# Patient Record
Sex: FEMALE | Race: OTHER | NOT HISPANIC OR LATINO | URBAN - METROPOLITAN AREA
[De-identification: names, ages, dates, MRNs, and addresses within clinical notes are randomized per-mention and may not be internally consistent; named-entity substitution may affect disease eponyms.]

---

## 2021-03-24 ENCOUNTER — EMERGENCY (EMERGENCY)
Facility: HOSPITAL | Age: 26
LOS: 1 days | Discharge: HOME | End: 2021-03-24
Admitting: EMERGENCY MEDICINE
Payer: COMMERCIAL

## 2021-03-24 VITALS
RESPIRATION RATE: 17 BRPM | OXYGEN SATURATION: 99 % | SYSTOLIC BLOOD PRESSURE: 123 MMHG | HEART RATE: 98 BPM | TEMPERATURE: 98 F | DIASTOLIC BLOOD PRESSURE: 81 MMHG

## 2021-03-24 PROCEDURE — 99282 EMERGENCY DEPT VISIT SF MDM: CPT

## 2021-03-24 PROCEDURE — U0005: CPT

## 2021-03-24 PROCEDURE — 99283 EMERGENCY DEPT VISIT LOW MDM: CPT

## 2021-03-24 PROCEDURE — U0003: CPT

## 2021-03-24 NOTE — ED PROVIDER NOTE - NS ED ROS FT
[address] [facility] 
[phone] Patient: Deja Eye MRN: [id number] :2007 You are allergic to the following No active allergies Recent Documentation Height Weight BMI  
  
  
 (!) 1.549 m (85 %, Z= 1.05)* 37.2 kg (39 %, Z= -0.27)* 15.49 kg/m2 (14 %, Z= -1.06)* *Growth percentiles are based on [facility] 2-20 Years data. Emergency Contacts Name Discharge Info Relation Home Work Mobile Non,Gvn About your child's hospitalization Your child was admitted on:  N/A Your child last received care in the:  [address] Your child was discharged on:  2017 Unit phone number:  [phone] Why your child was hospitalized Your child's primary diagnosis was:  Not on File Providers Seen During Your Hospitalizations Provider Role Specialty Primary office phone Dani Black MD Attending Provider Otolaryngology [phone] Your Primary Care Physician (PCP) Primary Care Physician Office Phone Office Fax NONE ** None ** ** None ** Follow-up Information Follow up With Details Comments Contact Info None   None ([phone]) Patient stated that they have no PCP Current Discharge Medication List  
  
Notice You have not been prescribed any medications. Discharge Instructions [facility] Tonsillectomy/Adenoidectomy Post-op Teaching For Ages 9 and Above Following your child's tonsillectomy/adenoidectomy there are several things that often occur. For three to six days after this surgery, your child may seem a little worse each day. This is a common problem for children after this surgery. You will be giving them pain medicine but they just will not feel well and they will not want much to eat or drink. BLEEDING:   
 If your child bleeds after the surgery, it is important that you contact [facility] immediately ([phone]). Fortunately, only a small number of children do this/ If you do not get an immediate response, go to the emergency room. You do not need to do this  If it is just blood-streaked spit. The bleeding typically occurs seven to ten days after surgery. DRINK PLENTY OF FLUIDS: 
Your child may become mildly dehydrated. Your goal is to make sure that this dehydration does not become serious enough that the child needs to have medical attention. Continuously offer your child small frequent sips of beverages. This will not be easy because will not feel like eating or drinking. If you think your child is not getting enough liquid, please call our office. DIET: 
Your child will heal faster with good nutrition. Crunchy foods such as chips, popcorn, nuts, hard candy, etc. should be avoided for two weeks after surgery/ If your child only had an adenoidectomy, there is no diet restriction. FEVER: 
More than likely there will be a postoperative fever. It is not unusual for a child to run a 101 or even a 102 fever for one to three days after the surgery. The [employer] has half of the normal dose of Tylenol so you can give half the child's usual dose of liquid Tylenol with the Norco if there is a fever after surgery. BAD BREATH: 
Your child is going to have bad breath for 7-10 days after surgery. The healing membrane over the operative area has a very strong odor. If you look in the back of the throat you will see this and it looks very much like a bad case of strep-throat, but it is not an infection. EAR PAIN: 
Your child may develop ear pain. In some children, the ear pain can be quite uncomfortable. This is a referred pain from having the tonsils removed and it is not a sign of an ear infection. TONGUE SWELLING: 
Your child will experience some tongue swelling after the surgery.  This is a side effect of the instrument that we use to hold the tongue out of the way during surgery. This is not a serious problem and goes away in a few days. GUM CHEWING: 
This stretches the area where the tonsils or adenoids were removed and some children have less pain with gum chewing. ASPIRIN: 
DO NOT give your child ANY ASPIRIN for at least 3 weeks after surgery. ACTIVITY: 
Have your child remain less active for two weeks after surgery. Avoid rough play, P.E., or sports. Discharge Orders None Introducing [facility]! Dear Parent or Guardian, Thank you for requesting a [employer] account for your child. With [employer], you can view your childs hospital or ER discharge instructions, current allergies, immunizations and much more. In order to access your childs information, we require a signed consent on file. Please see the [employer] department or call [phone] for instructions on completing a [employer] Proxy request.   
Additional Information If you have questions, please visit the Frequently Asked Questions section of the [employer] website at [email or web address] Remember, [employer] is NOT to be used for urgent needs. For medical emergencies, dial 911. Now available from your iPhone and Android! General Information Please provide this summary of care documentation to your next provider. Patient Signature:  ____________________________________________________________ Date:  ____________________________________________________________  
  
Bhumkia Mendez Provider Signature:  ____________________________________________________________ Date:  ____________________________________________________________ CONSTITUTIONAL:  No fever or chills, no bodyaches  HEENT:  No sore throat or headache, no nasal congestion  PULM:  No cough or shortness of breath  GI:  No diarrhea, no vomiting

## 2021-03-24 NOTE — ED PROVIDER NOTE - PATIENT PORTAL LINK FT
You can access the FollowMyHealth Patient Portal offered by [facility] by registering at the following website: [email or web address]. By joining [employer]’s FollowMyHealth portal, you will also be able to view your health information using other applications (apps) compatible with our system.

## 2021-03-25 LAB — SARS-COV-2 RNA SPEC QL NAA+PROBE: SIGNIFICANT CHANGE UP

## 2021-03-27 DIAGNOSIS — Z20.822 CONTACT WITH AND (SUSPECTED) EXPOSURE TO COVID-19: ICD-10-CM

## 2021-05-30 ENCOUNTER — EMERGENCY (EMERGENCY)
Facility: HOSPITAL | Age: 26
LOS: 1 days | Discharge: HOME | End: 2021-05-30
Admitting: EMERGENCY MEDICINE
Payer: COMMERCIAL

## 2021-05-30 VITALS
DIASTOLIC BLOOD PRESSURE: 75 MMHG | RESPIRATION RATE: 16 BRPM | OXYGEN SATURATION: 100 % | SYSTOLIC BLOOD PRESSURE: 114 MMHG | TEMPERATURE: 99 F | HEART RATE: 93 BPM

## 2021-05-30 LAB — SARS-COV-2 RNA SPEC QL NAA+PROBE: SIGNIFICANT CHANGE UP

## 2021-05-30 PROCEDURE — U0003: CPT

## 2021-05-30 PROCEDURE — U0005: CPT

## 2021-05-30 PROCEDURE — 99282 EMERGENCY DEPT VISIT SF MDM: CPT

## 2021-05-30 PROCEDURE — 99283 EMERGENCY DEPT VISIT LOW MDM: CPT

## 2021-05-30 NOTE — ED PROVIDER NOTE - PATIENT PORTAL LINK FT
You can access the FollowMyHealth Patient Portal offered by [facility] by registering at the following website: [email or web address]. By joining [employer]’s FollowMyHealth portal, you will also be able to view your health information using other applications (apps) compatible with our system.

## 2021-06-02 DIAGNOSIS — Z20.822 CONTACT WITH AND (SUSPECTED) EXPOSURE TO COVID-19: ICD-10-CM

## 2021-12-22 ENCOUNTER — EMERGENCY (EMERGENCY)
Facility: HOSPITAL | Age: 26
LOS: 1 days | Discharge: HOME | End: 2021-12-22
Admitting: EMERGENCY MEDICINE
Payer: COMMERCIAL

## 2021-12-22 VITALS
OXYGEN SATURATION: 100 % | TEMPERATURE: 98 F | DIASTOLIC BLOOD PRESSURE: 80 MMHG | WEIGHT: 139.99 LBS | SYSTOLIC BLOOD PRESSURE: 127 MMHG | HEART RATE: 98 BPM | RESPIRATION RATE: 18 BRPM | HEIGHT: 66 IN

## 2021-12-22 DIAGNOSIS — Z20.822 CONTACT WITH AND (SUSPECTED) EXPOSURE TO COVID-19: ICD-10-CM

## 2021-12-22 LAB — SARS-COV-2 RNA SPEC QL NAA+PROBE: SIGNIFICANT CHANGE UP

## 2021-12-22 PROCEDURE — U0003: CPT

## 2021-12-22 PROCEDURE — U0005: CPT

## 2021-12-22 PROCEDURE — 99283 EMERGENCY DEPT VISIT LOW MDM: CPT

## 2021-12-22 PROCEDURE — 99282 EMERGENCY DEPT VISIT SF MDM: CPT

## 2021-12-22 NOTE — ED ADULT NURSE NOTE - NSIMPLEMENTINTERV_GEN_ALL_ED
Implemented All Universal Safety Interventions:  [place] to call system. Call bell, personal items and telephone within reach. Instruct patient to call for assistance. Room bathroom lighting operational. Non-slip footwear when patient is off stretcher. Physically safe environment: no spills, clutter or unnecessary equipment. Stretcher in lowest position, wheels locked, appropriate side rails in place.

## 2021-12-22 NOTE — ED PROVIDER NOTE - PATIENT PORTAL LINK FT
You can access the FollowMyHealth Patient Portal offered by [facility] by registering at the following website: [email or web address]. By joining [employer]’s FollowMyHealth portal, you will also be able to view your health information using other applications (apps) compatible with our system.

## 2024-09-16 NOTE — ED PROVIDER NOTE - PHYSICAL EXAMINATION
Physical Exam:    CONSTITUTIONAL:  Generally well appearing, no acute distress, alert, awake and oriented  HEENT:  Moist mucous membranes, normal voice  PULM:  No accessory muscle use, speaking full sentences  SKIN:  Normal in appearance, normal color None